# Patient Record
Sex: MALE | Race: WHITE | NOT HISPANIC OR LATINO | URBAN - METROPOLITAN AREA
[De-identification: names, ages, dates, MRNs, and addresses within clinical notes are randomized per-mention and may not be internally consistent; named-entity substitution may affect disease eponyms.]

---

## 2017-01-03 ENCOUNTER — OFFICE VISIT - HEALTHEAST (OUTPATIENT)
Dept: FAMILY MEDICINE | Facility: CLINIC | Age: 42
End: 2017-01-03

## 2017-01-03 ENCOUNTER — COMMUNICATION - HEALTHEAST (OUTPATIENT)
Dept: TELEHEALTH | Facility: CLINIC | Age: 42
End: 2017-01-03

## 2017-01-03 DIAGNOSIS — J32.9 SINUSITIS: ICD-10-CM

## 2017-01-03 ASSESSMENT — MIFFLIN-ST. JEOR: SCORE: 1913.44

## 2017-01-20 ENCOUNTER — COMMUNICATION - HEALTHEAST (OUTPATIENT)
Dept: FAMILY MEDICINE | Facility: CLINIC | Age: 42
End: 2017-01-20

## 2017-01-20 DIAGNOSIS — I10 ESSENTIAL HYPERTENSION, BENIGN: ICD-10-CM

## 2017-06-16 ENCOUNTER — COMMUNICATION - HEALTHEAST (OUTPATIENT)
Dept: FAMILY MEDICINE | Facility: CLINIC | Age: 42
End: 2017-06-16

## 2017-06-16 DIAGNOSIS — I10 ESSENTIAL HYPERTENSION, BENIGN: ICD-10-CM

## 2018-01-23 ENCOUNTER — COMMUNICATION - HEALTHEAST (OUTPATIENT)
Dept: FAMILY MEDICINE | Facility: CLINIC | Age: 43
End: 2018-01-23

## 2018-01-23 DIAGNOSIS — I10 ESSENTIAL HYPERTENSION, BENIGN: ICD-10-CM

## 2018-04-23 ENCOUNTER — COMMUNICATION - HEALTHEAST (OUTPATIENT)
Dept: FAMILY MEDICINE | Facility: CLINIC | Age: 43
End: 2018-04-23

## 2018-04-23 DIAGNOSIS — I10 ESSENTIAL HYPERTENSION, BENIGN: ICD-10-CM

## 2018-05-22 ENCOUNTER — COMMUNICATION - HEALTHEAST (OUTPATIENT)
Dept: FAMILY MEDICINE | Facility: CLINIC | Age: 43
End: 2018-05-22

## 2018-05-22 DIAGNOSIS — I10 ESSENTIAL HYPERTENSION, BENIGN: ICD-10-CM

## 2018-06-18 ENCOUNTER — COMMUNICATION - HEALTHEAST (OUTPATIENT)
Dept: FAMILY MEDICINE | Facility: CLINIC | Age: 43
End: 2018-06-18

## 2018-06-18 DIAGNOSIS — I10 ESSENTIAL HYPERTENSION, BENIGN: ICD-10-CM

## 2018-06-26 ENCOUNTER — OFFICE VISIT - HEALTHEAST (OUTPATIENT)
Dept: FAMILY MEDICINE | Facility: CLINIC | Age: 43
End: 2018-06-26

## 2018-06-26 DIAGNOSIS — I10 ESSENTIAL HYPERTENSION, BENIGN: ICD-10-CM

## 2018-06-26 DIAGNOSIS — R06.83 SNORING: ICD-10-CM

## 2018-06-26 DIAGNOSIS — L73.9 FOLLICULITIS: ICD-10-CM

## 2018-06-26 LAB
ANION GAP SERPL CALCULATED.3IONS-SCNC: 7 MMOL/L (ref 5–18)
BUN SERPL-MCNC: 18 MG/DL (ref 8–22)
CALCIUM SERPL-MCNC: 9.4 MG/DL (ref 8.5–10.5)
CHLORIDE BLD-SCNC: 107 MMOL/L (ref 98–107)
CHOLEST SERPL-MCNC: 195 MG/DL
CO2 SERPL-SCNC: 26 MMOL/L (ref 22–31)
CREAT SERPL-MCNC: 0.88 MG/DL (ref 0.7–1.3)
FASTING STATUS PATIENT QL REPORTED: YES
GFR SERPL CREATININE-BSD FRML MDRD: >60 ML/MIN/1.73M2
GLUCOSE BLD-MCNC: 107 MG/DL (ref 70–125)
HDLC SERPL-MCNC: 33 MG/DL
LDLC SERPL CALC-MCNC: 129 MG/DL
POTASSIUM BLD-SCNC: 4.5 MMOL/L (ref 3.5–5)
SODIUM SERPL-SCNC: 140 MMOL/L (ref 136–145)
TRIGL SERPL-MCNC: 164 MG/DL

## 2018-06-26 RX ORDER — MUPIROCIN CALCIUM 20 MG/G
CREAM TOPICAL 2 TIMES DAILY
Qty: 30 G | Refills: 2 | Status: SHIPPED | OUTPATIENT
Start: 2018-06-26

## 2018-06-28 ENCOUNTER — COMMUNICATION - HEALTHEAST (OUTPATIENT)
Dept: FAMILY MEDICINE | Facility: CLINIC | Age: 43
End: 2018-06-28

## 2019-01-27 ENCOUNTER — COMMUNICATION - HEALTHEAST (OUTPATIENT)
Dept: FAMILY MEDICINE | Facility: CLINIC | Age: 44
End: 2019-01-27

## 2019-01-27 DIAGNOSIS — I10 ESSENTIAL HYPERTENSION, BENIGN: ICD-10-CM

## 2019-01-28 RX ORDER — LISINOPRIL 10 MG/1
10 TABLET ORAL DAILY
Qty: 90 TABLET | Refills: 1 | Status: SHIPPED | OUTPATIENT
Start: 2019-01-28

## 2021-05-30 VITALS — BODY MASS INDEX: 29.66 KG/M2 | HEIGHT: 72 IN | WEIGHT: 219 LBS

## 2021-06-01 VITALS — BODY MASS INDEX: 31.69 KG/M2 | WEIGHT: 230.4 LBS

## 2021-06-08 NOTE — PROGRESS NOTES
"Assessment/Plan:        1. Sinusitis  Over two weeks of symptoms and worsening sinus pressure, pain.   I do suspect sinusitis. Will prescribe Augmentin, denies allergy to PCN.   Discussed side effects of medications and proper use. Patient verbalized understanding.  Discussed home supportive care and recommended rest, fluids/ warm fluids, humidification, saline nasal spray, throat lozenges, gargle with warm salt water.    Follow up if worsening symptoms, questions or concerns  Reviewed red flags that would warrant urgent evaluation. Patient verbalized understanding.  Patient agreed and appeared pleased with plan    - amoxicillin-clavulanate (AUGMENTIN) 875-125 mg per tablet; Take 1 tablet by mouth 2 (two) times a day for 10 days.  Dispense: 20 tablet; Refill: 0        Subjective:    Patient ID: Christiane Weathers is a 41 y.o. male.    HPI Comments: 42 y/o male presents today for head cold. He started with cold but notes increase in sinus symptoms.   Has been going on over 2 weeks - he has headache pain, sinus pressure, pain, teeth pain,   Nasal drainage - green, brown purulent drainage.   Denies wheezing, shortness of breath or chest pain, fever, chills, sore throat  Denies GI symptoms - n/v/d, abdominal pain  He has lucy taking \"all sorts of stuff\" Ibuprofen, alkaselzer sever congestion, cough and cold over the counter.   Denies allergies to antibiotics.    Sick contacts: wife and family with similar symptoms      The following portions of the patient's history were reviewed and updated as appropriate: allergies, current medications, past family history, past medical history, past social history, past surgical history and problem list.    Review of Systems   Constitutional: Negative.  Negative for chills and fever.   HENT: Positive for dental problem, postnasal drip, rhinorrhea and sinus pressure. Negative for ear discharge, ear pain and sore throat.    Eyes: Negative.    Respiratory: Negative.    Cardiovascular: " "Negative.    Gastrointestinal: Negative.    Psychiatric/Behavioral: Negative.              Objective:    Physical Exam   Constitutional: He is oriented to person, place, and time. He appears well-developed and well-nourished. No distress.   HENT:   Right Ear: External ear normal.   Left Ear: External ear normal.   Purulent nasasl drainage  Sinus pain, frontal/maxillary   Eyes: Conjunctivae are normal.   Cardiovascular: Normal rate and normal heart sounds.    No murmur heard.  Pulmonary/Chest: Effort normal and breath sounds normal.   Lymphadenopathy:     He has no cervical adenopathy.   Neurological: He is alert and oriented to person, place, and time.   Skin: He is not diaphoretic.   Psychiatric: He has a normal mood and affect. His behavior is normal. Judgment normal.             Vitals:    01/03/17 1437   BP: 112/86   Patient Site: Left Arm   Patient Position: Sitting   Cuff Size: Adult Large   Pulse: 96   Temp: 97.8  F (36.6  C)   TempSrc: Oral   SpO2: 97%   Weight: 219 lb (99.3 kg)   Height: 5' 11.5\" (1.816 m)     Current Outpatient Prescriptions   Medication Sig Dispense Refill     aspirin 81 MG EC tablet Take 81 mg by mouth daily.       blood pressure monitor Kit Use 1 Units As Directed as needed. 1 each 0     lisinopril (PRINIVIL,ZESTRIL) 10 MG tablet Take 1 tablet (10 mg total) by mouth daily. 90 tablet 3     amoxicillin-clavulanate (AUGMENTIN) 875-125 mg per tablet Take 1 tablet by mouth 2 (two) times a day for 10 days. 20 tablet 0     No current facility-administered medications for this visit.      "

## 2021-06-16 PROBLEM — R06.83 SNORING: Status: ACTIVE | Noted: 2018-06-26

## 2021-06-18 NOTE — PROGRESS NOTES
ASSESSMENT:  1. Benign Essential Hypertension  - lisinopril (PRINIVIL,ZESTRIL) 10 MG tablet; Take 1 tablet (10 mg total) by mouth daily.  Dispense: 90 tablet; Refill: 1  - Basic Metabolic Panel  - Lipid Cascade    2. Folliculitis  - mupirocin (BACTROBAN) 2 % cream; Apply topically 2 (two) times a day.  Dispense: 30 g; Refill: 2    3. Snoring      PLAN:  Discussed the Snoring,he will inquire from his friend how he can do that through his [employer] insurance.  He will let me know so that we can either refer him to get one done.  Blood pressure seems to be doing quite well.  He does not check blood pressures at home.  Encouraged to exercise and make sure to take his medications.  Labs were ordered for today.  Medications were refilled and he will follow-up in 6 months.    Orders Placed This Encounter   Procedures     Basic Metabolic Panel     Lipid Cascade     Order Specific Question:   Fasting is required?     Answer:   Yes     Medications Discontinued During This Encounter   Medication Reason     lisinopril (PRINIVIL,ZESTRIL) 10 MG tablet Duplicate order     blood pressure monitor Kit Therapy completed     lisinopril (PRINIVIL,ZESTRIL) 10 MG tablet Reorder       No Follow-up on file.      CHIEF COMPLAINT:  Chief Complaint   Patient presents with     Medication Refill       HISTORY OF PRESENT ILLNESS:  Christiane is a 42 y.o. male presenting to the clinic today for medication check.  He has a history of hypertension which is being controlled with lisinopril.  He noted checking his blood pressures at home on the onset, but not really currently.  He denies having any symptoms that could be attributed to hypertension on medication.  He is physically active and is attempting to watch his diet although not very strictly.  He does snore, noting snoring irrespective of the position his leg.  Denies any headache waking up in the morning, but notes some easy fatigability as well as easily able to go to sleep.  He denies having any  other major concerns.  He does need to have a refill of Bactroban which he uses for folliculitis around the beard area.    REVIEW OF SYSTEMS:   He denies having palpitations no chest pain no shortness of breath.  He denies any swelling to his lower extremity.  Does not have any lightheadedness.  Gets heartburn intermittently if he eats spicy food.  Denies any nausea vomiting.  Does not have any anxiety and denies any mood issues.  All other systems are negative.    PFSH:  Reviewed, as below.    History   Smoking Status     Former Smoker   Smokeless Tobacco     Never Used       Family History   Problem Relation Age of Onset     Hypertension Mother      Hypertension Father      Hypertension Brother        Social History     Social History     Marital status:      Spouse name: N/A     Number of children: N/A     Years of education: N/A     Occupational History     Not on file.     Social History Main Topics     Smoking status: Former Smoker     Smokeless tobacco: Never Used     Alcohol use 3.0 oz/week     5 Cans of beer per week     Drug use: No     Sexual activity: Yes     Partners: Female     Other Topics Concern     Not on file     Social History Narrative       Past Surgical History:   Procedure Laterality Date     OR DENTAL SURGERY PROCEDURE      Description: Oral Surgery Tooth Extraction;  Recorded: 03/04/2013;     OR REMOVAL OF TONSILS,<13 Y/O      Description: Tonsillectomy;  Recorded: 03/04/2013;       No Known Allergies    Active Ambulatory Problems     Diagnosis Date Noted     Folliculitis      Benign Essential Hypertension      Snoring 06/26/2018     Resolved Ambulatory Problems     Diagnosis Date Noted     No Resolved Ambulatory Problems     No Additional Past Medical History       Current Outpatient Prescriptions   Medication Sig Dispense Refill     aspirin 81 MG EC tablet Take 81 mg by mouth daily.       Lactobacillus acidophilus (PROBIOTIC ORAL) Take 1 capsule by mouth daily.       lisinopril  (PRINIVIL,ZESTRIL) 10 MG tablet Take 1 tablet (10 mg total) by mouth daily. 90 tablet 1     omega-3 fatty acids 1,250 mg cap Take 1 capsule by mouth daily.       mupirocin (BACTROBAN) 2 % cream Apply topically 2 (two) times a day. 30 g 2     No current facility-administered medications for this visit.        VITALS:  Vitals:    06/26/18 0753   BP: 130/80   Patient Site: Left Arm   Patient Position: Sitting   Cuff Size: Adult Large   Pulse: 72   SpO2: 100%   Weight: (!) 230 lb 6.4 oz (104.5 kg)     Wt Readings from Last 3 Encounters:   06/26/18 (!) 230 lb 6.4 oz (104.5 kg)   01/03/17 219 lb (99.3 kg)   01/04/16 (!) 222 lb 8 oz (100.9 kg)     Body mass index is 31.69 kg/(m^2).    PHYSICAL EXAM:  General Appearance: Alert, cooperative, no distress, appears stated age  HEENT: Pupils are equal and reactive, extraocular motions is normal.  Oropharynx is moist.  Neck is supple no notable thyromegaly.  External ears are normal.  Lungs: Clear to auscultation bilaterally, respirations unlabored  Heart: Regular rate and rhythm, S1 and S2 normal, no murmur, rub, or gallop  Abdomen: Soft  Musculoskeletal: Normal range of motion. No joint swelling or deformity.   Neurologic:  Alert and oriented times 3. Normal reflexes. Cranial nerves II-XII intact.   Psychiatric: Normal mood and affect.    MEDICATIONS:  Current Outpatient Prescriptions   Medication Sig Dispense Refill     aspirin 81 MG EC tablet Take 81 mg by mouth daily.       Lactobacillus acidophilus (PROBIOTIC ORAL) Take 1 capsule by mouth daily.       lisinopril (PRINIVIL,ZESTRIL) 10 MG tablet Take 1 tablet (10 mg total) by mouth daily. 90 tablet 1     omega-3 fatty acids 1,250 mg cap Take 1 capsule by mouth daily.       mupirocin (BACTROBAN) 2 % cream Apply topically 2 (two) times a day. 30 g 2     No current facility-administered medications for this visit.

## 2021-06-23 NOTE — TELEPHONE ENCOUNTER
Refill Approved    Rx renewed per Medication Renewal Policy. Medication was last renewed on 6/26/18.    Mallory Villanueva, [facility] Triage/Med Refill 1/28/2019     Requested Prescriptions   Pending Prescriptions Disp Refills     lisinopril (PRINIVIL,ZESTRIL) 10 MG tablet 90 tablet 1     Sig: Take 1 tablet (10 mg total) by mouth daily.    [facility] Inhibitors Refill Protocol Passed - 1/27/2019 10:00 AM       Passed - PCP or prescribing provider visit in past 12 months      Last office visit with prescriber/PCP: 6/26/2018 Jesus Herrera MD OR same dept: 6/26/2018 Jesus Herrera MD OR same specialty: 6/26/2018 Jesus Herrera MD  Last physical: 1/4/2016 Last MTM visit: Visit date not found   Next visit within 3 mo: Visit date not found  Next physical within 3 mo: Visit date not found  Prescriber OR PCP: Jesus Herrera MD  Last diagnosis associated with med order: 1. Benign Essential Hypertension  - lisinopril (PRINIVIL,ZESTRIL) 10 MG tablet; Take 1 tablet (10 mg total) by mouth daily.  Dispense: 90 tablet; Refill: 1    If protocol passes may refill for 12 months if within 3 months of last provider visit (or a total of 15 months).            Passed - Serum Potassium in past 12 months    Lab Results   Component Value Date    Potassium 4.5 06/26/2018            Passed - Blood pressure filed in past 12 months    BP Readings from Last 1 Encounters:   06/26/18 130/80            Passed - Serum Creatinine in past 12 months    Creatinine   Date Value Ref Range Status   06/26/2018 0.88 0.70 - 1.30 mg/dL Final